# Patient Record
Sex: FEMALE | Race: WHITE | NOT HISPANIC OR LATINO | URBAN - METROPOLITAN AREA
[De-identification: names, ages, dates, MRNs, and addresses within clinical notes are randomized per-mention and may not be internally consistent; named-entity substitution may affect disease eponyms.]

---

## 2021-12-05 ENCOUNTER — INPATIENT (INPATIENT)
Facility: HOSPITAL | Age: 71
LOS: 0 days | Discharge: LEFT BEFORE DISCHARGE | DRG: 179 | End: 2021-12-05
Attending: INTERNAL MEDICINE | Admitting: INTERNAL MEDICINE
Payer: MEDICARE

## 2021-12-05 VITALS
SYSTOLIC BLOOD PRESSURE: 147 MMHG | TEMPERATURE: 100 F | DIASTOLIC BLOOD PRESSURE: 73 MMHG | OXYGEN SATURATION: 93 % | RESPIRATION RATE: 20 BRPM | HEART RATE: 82 BPM

## 2021-12-05 VITALS
HEIGHT: 55 IN | DIASTOLIC BLOOD PRESSURE: 79 MMHG | SYSTOLIC BLOOD PRESSURE: 140 MMHG | RESPIRATION RATE: 20 BRPM | OXYGEN SATURATION: 92 % | TEMPERATURE: 97 F | WEIGHT: 176.37 LBS | HEART RATE: 78 BPM

## 2021-12-05 DIAGNOSIS — U07.1 COVID-19: ICD-10-CM

## 2021-12-05 LAB
ALBUMIN SERPL ELPH-MCNC: 2.9 G/DL — LOW (ref 3.5–5)
ALP SERPL-CCNC: 90 U/L — SIGNIFICANT CHANGE UP (ref 40–120)
ALT FLD-CCNC: 25 U/L DA — SIGNIFICANT CHANGE UP (ref 10–60)
ANION GAP SERPL CALC-SCNC: 9 MMOL/L — SIGNIFICANT CHANGE UP (ref 5–17)
APPEARANCE UR: CLEAR — SIGNIFICANT CHANGE UP
APTT BLD: 30.9 SEC — SIGNIFICANT CHANGE UP (ref 27.5–35.5)
AST SERPL-CCNC: 23 U/L — SIGNIFICANT CHANGE UP (ref 10–40)
BASOPHILS # BLD AUTO: 0.09 K/UL — SIGNIFICANT CHANGE UP (ref 0–0.2)
BASOPHILS NFR BLD AUTO: 1.6 % — SIGNIFICANT CHANGE UP (ref 0–2)
BILIRUB SERPL-MCNC: 0.3 MG/DL — SIGNIFICANT CHANGE UP (ref 0.2–1.2)
BILIRUB UR-MCNC: NEGATIVE — SIGNIFICANT CHANGE UP
BUN SERPL-MCNC: 9 MG/DL — SIGNIFICANT CHANGE UP (ref 7–18)
CALCIUM SERPL-MCNC: 8.6 MG/DL — SIGNIFICANT CHANGE UP (ref 8.4–10.5)
CHLORIDE SERPL-SCNC: 99 MMOL/L — SIGNIFICANT CHANGE UP (ref 96–108)
CO2 SERPL-SCNC: 26 MMOL/L — SIGNIFICANT CHANGE UP (ref 22–31)
COLOR SPEC: YELLOW — SIGNIFICANT CHANGE UP
CREAT SERPL-MCNC: 0.8 MG/DL — SIGNIFICANT CHANGE UP (ref 0.5–1.3)
D DIMER BLD IA.RAPID-MCNC: <150 NG/ML DDU — SIGNIFICANT CHANGE UP
DIFF PNL FLD: ABNORMAL
EOSINOPHIL # BLD AUTO: 0.02 K/UL — SIGNIFICANT CHANGE UP (ref 0–0.5)
EOSINOPHIL NFR BLD AUTO: 0.4 % — SIGNIFICANT CHANGE UP (ref 0–6)
FIBRINOGEN PPP-MCNC: 559 MG/DL — HIGH (ref 290–520)
GLUCOSE SERPL-MCNC: 283 MG/DL — HIGH (ref 70–99)
GLUCOSE UR QL: 1000 MG/DL
HCT VFR BLD CALC: 39.7 % — SIGNIFICANT CHANGE UP (ref 34.5–45)
HGB BLD-MCNC: 13.1 G/DL — SIGNIFICANT CHANGE UP (ref 11.5–15.5)
IMM GRANULOCYTES NFR BLD AUTO: 0.9 % — SIGNIFICANT CHANGE UP (ref 0–1.5)
INR BLD: 1.01 RATIO — SIGNIFICANT CHANGE UP (ref 0.88–1.16)
KETONES UR-MCNC: NEGATIVE — SIGNIFICANT CHANGE UP
LACTATE SERPL-SCNC: 1.2 MMOL/L — SIGNIFICANT CHANGE UP (ref 0.7–2)
LDH SERPL L TO P-CCNC: 233 U/L — HIGH (ref 120–225)
LEUKOCYTE ESTERASE UR-ACNC: NEGATIVE — SIGNIFICANT CHANGE UP
LYMPHOCYTES # BLD AUTO: 1.82 K/UL — SIGNIFICANT CHANGE UP (ref 1–3.3)
LYMPHOCYTES # BLD AUTO: 32.6 % — SIGNIFICANT CHANGE UP (ref 13–44)
MCHC RBC-ENTMCNC: 27.7 PG — SIGNIFICANT CHANGE UP (ref 27–34)
MCHC RBC-ENTMCNC: 33 GM/DL — SIGNIFICANT CHANGE UP (ref 32–36)
MCV RBC AUTO: 83.9 FL — SIGNIFICANT CHANGE UP (ref 80–100)
MONOCYTES # BLD AUTO: 0.59 K/UL — SIGNIFICANT CHANGE UP (ref 0–0.9)
MONOCYTES NFR BLD AUTO: 10.6 % — SIGNIFICANT CHANGE UP (ref 2–14)
NEUTROPHILS # BLD AUTO: 3.02 K/UL — SIGNIFICANT CHANGE UP (ref 1.8–7.4)
NEUTROPHILS NFR BLD AUTO: 53.9 % — SIGNIFICANT CHANGE UP (ref 43–77)
NITRITE UR-MCNC: NEGATIVE — SIGNIFICANT CHANGE UP
NRBC # BLD: 0 /100 WBCS — SIGNIFICANT CHANGE UP (ref 0–0)
NT-PROBNP SERPL-SCNC: 77 PG/ML — SIGNIFICANT CHANGE UP (ref 0–125)
PH UR: 5 — SIGNIFICANT CHANGE UP (ref 5–8)
PLATELET # BLD AUTO: 238 K/UL — SIGNIFICANT CHANGE UP (ref 150–400)
POTASSIUM SERPL-MCNC: 3.6 MMOL/L — SIGNIFICANT CHANGE UP (ref 3.5–5.3)
POTASSIUM SERPL-SCNC: 3.6 MMOL/L — SIGNIFICANT CHANGE UP (ref 3.5–5.3)
PROT SERPL-MCNC: 8 G/DL — SIGNIFICANT CHANGE UP (ref 6–8.3)
PROT UR-MCNC: 30 MG/DL
PROTHROM AB SERPL-ACNC: 12 SEC — SIGNIFICANT CHANGE UP (ref 10.6–13.6)
RBC # BLD: 4.73 M/UL — SIGNIFICANT CHANGE UP (ref 3.8–5.2)
RBC # FLD: 12.6 % — SIGNIFICANT CHANGE UP (ref 10.3–14.5)
SARS-COV-2 RNA SPEC QL NAA+PROBE: DETECTED
SODIUM SERPL-SCNC: 134 MMOL/L — LOW (ref 135–145)
SP GR SPEC: 1.01 — SIGNIFICANT CHANGE UP (ref 1.01–1.02)
TROPONIN I, HIGH SENSITIVITY RESULT: 5.8 NG/L — SIGNIFICANT CHANGE UP
UROBILINOGEN FLD QL: NEGATIVE — SIGNIFICANT CHANGE UP
WBC # BLD: 5.59 K/UL — SIGNIFICANT CHANGE UP (ref 3.8–10.5)
WBC # FLD AUTO: 5.59 K/UL — SIGNIFICANT CHANGE UP (ref 3.8–10.5)

## 2021-12-05 PROCEDURE — 99284 EMERGENCY DEPT VISIT MOD MDM: CPT

## 2021-12-05 PROCEDURE — 71045 X-RAY EXAM CHEST 1 VIEW: CPT | Mod: 26

## 2021-12-05 RX ORDER — ENOXAPARIN SODIUM 100 MG/ML
40 INJECTION SUBCUTANEOUS EVERY 12 HOURS
Refills: 0 | Status: DISCONTINUED | OUTPATIENT
Start: 2021-12-05 | End: 2021-12-05

## 2021-12-05 RX ORDER — REMDESIVIR 5 MG/ML
INJECTION INTRAVENOUS
Refills: 0 | Status: DISCONTINUED | OUTPATIENT
Start: 2021-12-05 | End: 2021-12-05

## 2021-12-05 RX ORDER — ACETAMINOPHEN 500 MG
650 TABLET ORAL EVERY 4 HOURS
Refills: 0 | Status: DISCONTINUED | OUTPATIENT
Start: 2021-12-05 | End: 2021-12-05

## 2021-12-05 RX ORDER — DEXAMETHASONE 0.5 MG/5ML
6 ELIXIR ORAL DAILY
Refills: 0 | Status: DISCONTINUED | OUTPATIENT
Start: 2021-12-05 | End: 2021-12-05

## 2021-12-05 RX ORDER — ALBUTEROL 90 UG/1
6 AEROSOL, METERED ORAL ONCE
Refills: 0 | Status: COMPLETED | OUTPATIENT
Start: 2021-12-05 | End: 2021-12-05

## 2021-12-05 RX ORDER — ALBUTEROL 90 UG/1
2 AEROSOL, METERED ORAL EVERY 4 HOURS
Refills: 0 | Status: DISCONTINUED | OUTPATIENT
Start: 2021-12-05 | End: 2021-12-05

## 2021-12-05 RX ORDER — PANTOPRAZOLE SODIUM 20 MG/1
40 TABLET, DELAYED RELEASE ORAL
Refills: 0 | Status: DISCONTINUED | OUTPATIENT
Start: 2021-12-05 | End: 2021-12-05

## 2021-12-05 RX ORDER — REMDESIVIR 5 MG/ML
200 INJECTION INTRAVENOUS EVERY 24 HOURS
Refills: 0 | Status: DISCONTINUED | OUTPATIENT
Start: 2021-12-05 | End: 2021-12-05

## 2021-12-05 RX ORDER — ENOXAPARIN SODIUM 100 MG/ML
80 INJECTION SUBCUTANEOUS EVERY 12 HOURS
Refills: 0 | Status: DISCONTINUED | OUTPATIENT
Start: 2021-12-05 | End: 2021-12-05

## 2021-12-05 RX ADMIN — ALBUTEROL 6 PUFF(S): 90 AEROSOL, METERED ORAL at 16:27

## 2021-12-05 NOTE — ED PROVIDER NOTE - OBJECTIVE STATEMENT
Declines , uses daughter at bedside.  71yoF with h/o HTN, PVD, presents with her daughter with c/o cough, fever, SOB x 4 days. SOB is worsened and blayne 2/2 cough. Denies vomiting, rash, and all other symptoms. Never vaccinated for COVID. Here with family member who also has cough, fever, SOB.

## 2021-12-05 NOTE — ED PROVIDER NOTE - PROGRESS NOTE DETAILS
sohrawardy:  covid+  hypoxic wo O2 to 80s, comes up to 90s with NC  ddimer negative, no suspicion acute pe at this time given dimer and po2 improves with NC, which is typical finding of covid infection  admitted to dr. hartman for hypoxia 2/2 covid req supp o2 patient refusing admission.   patient is with her son, who is also admitted for covid and daughter who is not sick and pt states that she does not want to stay. she states that she feels well, does not trust hospitals and wants to go home. patient and daughter and son states that they understand the risks which may include, death, disability, illness, etc. patient and children understand the risks and pt wants to leave [facility]a. she is mentating clearly, aaox3. patient will be AMAd and will go home with daughter.

## 2021-12-05 NOTE — ED PROVIDER NOTE - CLINICAL SUMMARY MEDICAL DECISION MAKING FREE TEXT BOX
+sick contact and high suspicion for COVID. No e/o DVT to suggest DVT. No e/o fluid overload, PNA, PTX on exam or CXR. No WOB. NAD. Signed off care to Dr. KONSTANTIN Wei who will f/u labs and reassess, admission.

## 2021-12-05 NOTE — ED ADULT NURSE NOTE - NSIMPLEMENTINTERV_GEN_ALL_ED
Implemented All Universal Safety Interventions:  [place] to call system. Call bell, personal items and telephone within reach. Instruct patient to call for assistance. Room bathroom lighting operational. Non-slip footwear when patient is off stretcher. Physically safe environment: no spills, clutter or unnecessary equipment. Stretcher in lowest position, wheels locked, appropriate side rails in place.

## 2021-12-05 NOTE — ED PROVIDER NOTE - REFUSAL OF SERVICE, MDM
Patient,daughter and son present and they are all of sound mind. patient is aaox3, refusing admission. extensive conversation had with patient and family regarding her critical condition, need for supplemental O2 and inpatient management. patient adamantly refused and daughter supports her, will take her home.

## 2021-12-05 NOTE — ED PROVIDER NOTE - PHYSICAL EXAMINATION
Afebrile, hemodynamically stable, saturating 91% on RA  NAD, nontoxic appearing, frequent coughing, no WOB  Head NCAT  EOMI grossly, anicteric  MM dry  No JVD  RRR, nml S1/S2, no m/r/g  Lungs CTAB, no w/r/r  Abd soft, NT, ND, nml BS, no rebound or guarding  AAO, CN's 3-12 grossly intact  FLANAGAN spontaneously, no leg cyanosis or edema  Skin warm, dry

## 2021-12-05 NOTE — ED PROVIDER NOTE - NSFOLLOWUPINSTRUCTIONS_ED_ALL_ED_FT
Left message for pt to call back and schedule per message below. Dr. Polanco is offering a work in appt for this Friday 8/13 she said to double book at 9am please.    We have swabbed you for COVID today and/or sent your antibodies.  Results can take from 1-5 days, depending on how backed up other laboratory is.  Remember, the results are only 70% accurate, so if you have COVID symptoms, you still likely have COVID even if your test is negative.  We rely on SYMPTOMS more than the test.    **If you HAVE SYMPTOMS, please SELF ISOLATE for 14 DAYS MINIMUM  **If you HAVE FEVER, you must SELF ISOLATE until the fever is gone, PLUS ONE DAY.    (For Example, If you have FEVER for 6 days, then you must ISOLATE for 14 days (that is the minimum)  If you have FEVER for 18 days, and then on day 19 you have no fever, you must wait 24 hours, so you can leave your house/interact with others on Day 20.)    ** If you had exposure to COVID but no symptoms and are COVID POSITIVE,  YOU ARE TO QUARENTINE FOR 10 DAYS    **** If you had exposure to COVID but have no symptoms and are COVID NEGATIVE, we still recommend you QUARENTINE FOR 10 DAYS, because the test is only 70% accurate, so sometimes it is wrong.  You staying home and isolated will help [place] COVID cases stay low.     Thankfully survival rates are much better than they were before.  The fear and anxiety that comes from COVID often times has longer lasting effects than the disease itself.  Please stay calm.    If you are having symptoms, we recommend you buy a PULSE OXIMETER to check your Oxygen level every day and/or if you're short of breath.  If your Oxygen is < 93% or you are severely short of breath, return to the ER.  Otherwise, stay home and isolated.      TIPS TO HELP FIGHT SYMPTOMS AND MINIMIZE SPREAD:    1. STAY HOME for at least 14 DAYS.  Wait until you have NO SYMPTOMS FOR 3 days before you leave your house.    2. Stay 6 feet away from everyone in your life, especially the elderly.  If you live with elderly, try to find them (or yourself) another place to stay.      3. If you have sypmtoms, ALWAYS WEAR A MASK, even at home, to protect your family.    4.  Sleep in a seperate bed from anyone who do not have symptoms.       5. Family member should leave food outside our door then leave and you can open your door to retrieve it.    6.  Keep your dirty dishes in your room until you are better (3 days with NO FEVER).  Consider using plasticware.      7.  Wash your hands every time you touch something and EVERY HOUR for 20 seconds each time.      8. DRINK ONE LITER of Pedialyte or  Gatorade per day to hlep with fatigue, lightheadedness, and body aches.     9.  Take TYLENOL 650 mg every 6 hours for fever, body pain or headache.  Take Ibuprofen 600 mg if your pain or fever continues despite fluids and Tylenol.   You can take Tylenol and ibuprofen together for added effect.     9. For trouble breathing and coughing         - LAY ON YOUR STOMACH for ten minutes every hour.  This opens up your lungs in the back.           - SLEEP ON YOUR STOMACH all night         - Use over the counter Cough syrup          - Use an albuterol inhaler    10. Some supplements to help boost your immune system: Zinc 200 mg daily              Multivitamin, Omega 3, Vitamin B12, C, D, E and Tumeric    11. RETURN TO THE ER IMMEDIATELY IF YOU HAVE WORSENING SHORTNESS OF BREATH, CONFUSION or YOUR LIPS/FACE TURN BLUE

## 2021-12-05 NOTE — ED PROVIDER NOTE - PATIENT PORTAL LINK FT
You can access the FollowMyHealth Patient Portal offered by [facility] by registering at the following website: [email or web address]. By joining [facility]’s FollowMyHealth portal, you will also be able to view your health information using other applications (apps) compatible with our system.

## 2021-12-06 LAB
CRP SERPL-MCNC: 52 MG/L — HIGH
CULTURE RESULTS: SIGNIFICANT CHANGE UP
FERRITIN SERPL-MCNC: 178 NG/ML — HIGH (ref 15–150)
PROCALCITONIN SERPL-MCNC: 0.05 NG/ML — SIGNIFICANT CHANGE UP (ref 0.02–0.1)
SPECIMEN SOURCE: SIGNIFICANT CHANGE UP

## 2021-12-22 PROCEDURE — 83605 ASSAY OF LACTIC ACID: CPT

## 2021-12-22 PROCEDURE — 87635 SARS-COV-2 COVID-19 AMP PRB: CPT

## 2021-12-22 PROCEDURE — 85384 FIBRINOGEN ACTIVITY: CPT

## 2021-12-22 PROCEDURE — 81001 URINALYSIS AUTO W/SCOPE: CPT

## 2021-12-22 PROCEDURE — 87086 URINE CULTURE/COLONY COUNT: CPT

## 2021-12-22 PROCEDURE — 99285 EMERGENCY DEPT VISIT HI MDM: CPT

## 2021-12-22 PROCEDURE — 83880 ASSAY OF NATRIURETIC PEPTIDE: CPT

## 2021-12-22 PROCEDURE — 85025 COMPLETE CBC W/AUTO DIFF WBC: CPT

## 2021-12-22 PROCEDURE — 84484 ASSAY OF TROPONIN QUANT: CPT

## 2021-12-22 PROCEDURE — 84145 PROCALCITONIN (PCT): CPT

## 2021-12-22 PROCEDURE — 80053 COMPREHEN METABOLIC PANEL: CPT

## 2021-12-22 PROCEDURE — 86140 C-REACTIVE PROTEIN: CPT

## 2021-12-22 PROCEDURE — 85379 FIBRIN DEGRADATION QUANT: CPT

## 2021-12-22 PROCEDURE — 85610 PROTHROMBIN TIME: CPT

## 2021-12-22 PROCEDURE — 82728 ASSAY OF FERRITIN: CPT

## 2021-12-22 PROCEDURE — 36415 COLL VENOUS BLD VENIPUNCTURE: CPT

## 2021-12-22 PROCEDURE — 94640 AIRWAY INHALATION TREATMENT: CPT

## 2021-12-22 PROCEDURE — 85730 THROMBOPLASTIN TIME PARTIAL: CPT

## 2021-12-22 PROCEDURE — 71045 X-RAY EXAM CHEST 1 VIEW: CPT

## 2021-12-22 PROCEDURE — 83615 LACTATE (LD) (LDH) ENZYME: CPT

## 2024-09-04 NOTE — ED ADULT NURSE NOTE - NS TRANSFER PATIENT BELONGINGS
Patient was seen in the ER over the weekend for abdominal pain. Was told she has fibroids. Spouse is calling hoping she can be contacted for guidance on next steps. Please call.   
Talked to pt's . Pt has new OB visit on 09/16/24. Advised that the pt should come into the office in the next couple of days for a ER follow up visit. Pt's  voices understanding, and that he is going to talk to his wife to see what day and time would work for her, and then call the office back to schedule.  
Upon chart review, patient scheduled with Mary Ellen Magaña tomorrow.   
Clothing

## 2024-12-24 PROBLEM — Z00.00 ENCOUNTER FOR PREVENTIVE HEALTH EXAMINATION: Status: ACTIVE | Noted: 2024-12-24

## 2025-01-02 ENCOUNTER — APPOINTMENT (OUTPATIENT)
Age: 75
End: 2025-01-02